# Patient Record
Sex: MALE | Race: WHITE | NOT HISPANIC OR LATINO | URBAN - METROPOLITAN AREA
[De-identification: names, ages, dates, MRNs, and addresses within clinical notes are randomized per-mention and may not be internally consistent; named-entity substitution may affect disease eponyms.]

---

## 2024-12-02 ENCOUNTER — APPOINTMENT (OUTPATIENT)
Dept: PAIN MEDICINE | Facility: CLINIC | Age: 69
End: 2024-12-02

## 2024-12-16 ENCOUNTER — OFFICE VISIT (OUTPATIENT)
Dept: PAIN MEDICINE | Facility: CLINIC | Age: 69
End: 2024-12-16
Payer: MEDICARE

## 2024-12-16 VITALS
HEART RATE: 75 BPM | DIASTOLIC BLOOD PRESSURE: 77 MMHG | RESPIRATION RATE: 18 BRPM | OXYGEN SATURATION: 97 % | TEMPERATURE: 96.3 F | SYSTOLIC BLOOD PRESSURE: 145 MMHG

## 2024-12-16 DIAGNOSIS — M51.26 LUMBAR DISC DISPLACEMENT WITHOUT MYELOPATHY: Primary | ICD-10-CM

## 2024-12-16 PROCEDURE — 1159F MED LIST DOCD IN RCRD: CPT | Performed by: PAIN MEDICINE

## 2024-12-16 PROCEDURE — 99204 OFFICE O/P NEW MOD 45 MIN: CPT | Performed by: PAIN MEDICINE

## 2024-12-16 PROCEDURE — 1125F AMNT PAIN NOTED PAIN PRSNT: CPT | Performed by: PAIN MEDICINE

## 2024-12-16 PROCEDURE — G2211 COMPLEX E/M VISIT ADD ON: HCPCS | Performed by: PAIN MEDICINE

## 2024-12-16 PROCEDURE — 99214 OFFICE O/P EST MOD 30 MIN: CPT | Performed by: PAIN MEDICINE

## 2024-12-16 RX ORDER — EPINEPHRINE 0.22MG
100 AEROSOL WITH ADAPTER (ML) INHALATION
COMMUNITY

## 2024-12-16 RX ORDER — ATORVASTATIN CALCIUM 20 MG/1
20 TABLET, FILM COATED ORAL DAILY
COMMUNITY

## 2024-12-16 RX ORDER — ALBUTEROL SULFATE 90 UG/1
2 INHALANT RESPIRATORY (INHALATION) EVERY 4 HOURS PRN
COMMUNITY
Start: 2024-08-20

## 2024-12-16 RX ORDER — LISINOPRIL 10 MG/1
10 TABLET ORAL DAILY
COMMUNITY

## 2024-12-16 RX ORDER — HYDROCHLOROTHIAZIDE 12.5 MG/1
12.5 TABLET ORAL DAILY
COMMUNITY
Start: 2024-03-22

## 2024-12-16 ASSESSMENT — PAIN DESCRIPTION - DESCRIPTORS: DESCRIPTORS: ACHING

## 2024-12-16 ASSESSMENT — PAIN - FUNCTIONAL ASSESSMENT: PAIN_FUNCTIONAL_ASSESSMENT: 0-10

## 2024-12-16 ASSESSMENT — PATIENT HEALTH QUESTIONNAIRE - PHQ9
1. LITTLE INTEREST OR PLEASURE IN DOING THINGS: NOT AT ALL
2. FEELING DOWN, DEPRESSED OR HOPELESS: NOT AT ALL
SUM OF ALL RESPONSES TO PHQ9 QUESTIONS 1 AND 2: 0

## 2024-12-16 ASSESSMENT — COLUMBIA-SUICIDE SEVERITY RATING SCALE - C-SSRS
1. IN THE PAST MONTH, HAVE YOU WISHED YOU WERE DEAD OR WISHED YOU COULD GO TO SLEEP AND NOT WAKE UP?: NO
6. HAVE YOU EVER DONE ANYTHING, STARTED TO DO ANYTHING, OR PREPARED TO DO ANYTHING TO END YOUR LIFE?: NO
2. HAVE YOU ACTUALLY HAD ANY THOUGHTS OF KILLING YOURSELF?: NO

## 2024-12-16 ASSESSMENT — PAIN SCALES - GENERAL: PAINLEVEL_OUTOF10: 4

## 2024-12-16 NOTE — H&P
History Of Present Illness  Neel Martinez is a 69 y.o. male  Patient referred by orthopedics, Dr. Encarnacion, for pain in lower back. Has slightly herniated disc. Majority of pain is right lower back. Had an MRI 9/10   Currently under the care of chiropractor that he described as beneficial but is not taking all the pain away it assist him to continue to move and ambulate.  Was also tried on ibuprofen that seems to alleviate the pain also for short duration of time.  Pain seems to be aggravated during standing.  Described as a sharp sensation when it gets bad.  Pain improves if he keeps his activity low and take the ibuprofen and do the stretching that he learned from the physical therapy.  Denies any recent injection he had in the past some injections through Dr. Mariano trujillo in 2020.  Describe some numbness at the bottom of his feet  Describe more pain on the right side and less on the left the pain mainly radiate to the upper part of the right thigh.  Pain is aggravated also by sitting for any extended duration of time or by laying flat on his back for extended duration of time  Past Medical History  Hypercholesterolemia  Hypertension     Surgical History  Knee replacement   Carpal tunnel  Clavicle repair    Social History  He has no history on file for tobacco use, alcohol use, and drug use.    Family History  Mother with scoliosis     Allergies  Allergies   Allergen Reactions    Sulfa (Sulfonamide Antibiotics) Shortness of breath     Review of Systems   12 Systems have been reviewed as follows.   Constitutional: Fever, weight gain, weight loss, appetite change, night sweats, fatigue, chills.  Eyes : blurry, double vision, vision, loss, tearing, redness, pain, sensitivity to light, glaucoma.  Ears, nose, mouth, and throat: Hearing loss, ringing in the ears, ear pain, nasal congestion, nasal drainage, nosebleeds, mouth, throat, irritation tooth problem.  Cardiovascular :chest pain, pressure, heart tracing,palpaitations  , sweating, leg swelling, high or low blood pressure  Pulmonary: Cough, yellow or green sputum, blood and sputum, shortness of breath, wheezing  Gastrointestinal: Nause, vomiting, diarrhea, constipation, pain, blood in stool, or vomitus, heartburn, difficulty swallowing  Genitourinary: incontinence, abnormal bleeding, abnormal discharge, urinary frequency, urinary hesitancy, pain, impotence sexual problem, infection, urinary retention  Musculoskeletal: Pain, stiffness, joint, redness or warmth, arthritis, back pain, weakness, muscle wasting, sprain or fracture  Neuro: Weight weakness, dizziness, change in voice, change in taste change in vision, change in hearing, loss, or change of sensation, trouble walking, balance problems coordination problems, shaking, speech problem  Endocrine , cold or heat intolerance, blood sugar problem, weight gain or loss missed periods hot flashes, sweats, change in body hair, change in libido, increased thirst, increased urination  Heme/lymph: Swelling, bleeding, problem anemia, bruising, enlarged lymph nodes  Allergic/immunologic: H. plus nasal drip, watery itchy eyes, nasal drainage, immunosuppressed  The above, were reviewed and noted negative except as noted.    Physical Exam   Vital signs reviewed, documented in chart     General:  Appears well, does not look in any major distress  Alert    HEENT:  Head atraumatic  Eyes normal inspection  PERRL  Normal ENT inspection  No signs of dehydration    NECK:  Normal inspection  Range of motion within normal     RESPIRATORY:  No respiratory distress    CVS:  Heart rate and rhythm regular    ABDOMEN/GI  Soft  Non-tender  No distention  No organomegaly      BACK:  Normal inspection, flexion and extension within normal limit   no tenderness upon the palpation of the facet joint  Si joints none tender to palpations     EXTREMITIES:  Non-Tender  Full ROM  Normal appearance  No Pedal edema  Power symmetrical , sensory examination  preserved.    NEURO:  Alert and oriented X 3  CNS normal as tested without focal neurological deficit   Sensation normal  Motor normal  reflexes normal    PSYCH:  Mood normal  Affect normal    SKIN:  Color normal  No rash  Warm  Dry  no sign of skin marking supportive of IV drug usage /abuse.    Last Recorded Vitals  Blood pressure 145/77, pulse 75, temperature 35.7 °C (96.3 °F), temperature source Temporal, resp. rate 18, SpO2 97%.    Assessment/Plan   69 years old with history and physical examination supportive of lumbar disc displacement tried and failed conservative management with chiropractic manipulation and nonsteroidal anti-inflammatory    Plan  Recommended for the patient to facilitate obtaining the results of the MRI of the lumbar spine area that were recently obtained through [facility]  I would recommend for the patient a lumbar epidural steroid injection to be performed under fluoroscopic guidance targeting the L4-L5 or the L5-S1 level with injection of contrast material to confirm needle placement benefits and risk were discussed with the patient and he would like to proceed I will reevaluate him after the performance of the epidural for further recommendation as his case progress      The above clinical summary has been dictated with voice recognition software. It has not been proofread for grammatical errors, typographical mistakes, or other semantic inconsistencies.    Thank you for visiting our office today. It was our pleasure to take part in your healthcare.     Please do not hesitate to contact the pain clinic after your visit with any questions or concerns at  M-F 8-4 pm       Bren Merrill M.D.  Medical Director , Division of Pain Medicine [facility]   of Anesthesiology and Pain Medicine  [facility] [employer]     [facility]  [id number]  Former[facility]. [address] [place] [id number]     Office: [phone]  Fax: [phone]      Bren Merrill MD

## 2024-12-16 NOTE — PROGRESS NOTES
Patient referred by orthopedics, Dr. Encarnacion, for  pain in lower back. Has slightly herniated disc. Majority of pain is right lower back. Had an MRI 9/10

## 2024-12-23 ENCOUNTER — LAB (OUTPATIENT)
Dept: LAB | Facility: CLINIC | Age: 69
End: 2024-12-23
Payer: MEDICARE

## 2024-12-23 ENCOUNTER — OFFICE VISIT (OUTPATIENT)
Dept: HEMATOLOGY/ONCOLOGY | Facility: CLINIC | Age: 69
End: 2024-12-23
Payer: MEDICARE

## 2024-12-23 VITALS
SYSTOLIC BLOOD PRESSURE: 112 MMHG | OXYGEN SATURATION: 96 % | HEART RATE: 97 BPM | DIASTOLIC BLOOD PRESSURE: 71 MMHG | WEIGHT: 234.35 LBS | TEMPERATURE: 97 F | BODY MASS INDEX: 32.7 KG/M2 | RESPIRATION RATE: 16 BRPM

## 2024-12-23 DIAGNOSIS — R89.8 ABNORMAL BONE MARROW EXAMINATION: Primary | ICD-10-CM

## 2024-12-23 DIAGNOSIS — I10 PRIMARY HYPERTENSION: ICD-10-CM

## 2024-12-23 DIAGNOSIS — D46.Z OTHER MYELODYSPLASTIC SYNDROMES: ICD-10-CM

## 2024-12-23 DIAGNOSIS — E66.812 CLASS 2 OBESITY WITHOUT SERIOUS COMORBIDITY WITH BODY MASS INDEX (BMI) OF 37.0 TO 37.9 IN ADULT, UNSPECIFIED OBESITY TYPE: ICD-10-CM

## 2024-12-23 DIAGNOSIS — E78.2 MIXED HYPERLIPIDEMIA: ICD-10-CM

## 2024-12-23 DIAGNOSIS — R89.8 ABNORMAL BONE MARROW EXAMINATION: ICD-10-CM

## 2024-12-23 LAB
BASOPHILS # BLD AUTO: 0.03 X10*3/UL (ref 0–0.1)
BASOPHILS NFR BLD AUTO: 0.3 %
EOSINOPHIL # BLD AUTO: 0.34 X10*3/UL (ref 0–0.7)
EOSINOPHIL NFR BLD AUTO: 4 %
ERYTHROCYTE [DISTWIDTH] IN BLOOD BY AUTOMATED COUNT: 12.6 % (ref 11.5–14.5)
HCT VFR BLD AUTO: 43.4 % (ref 41–52)
HGB BLD-MCNC: 15 G/DL (ref 13.5–17.5)
IMM GRANULOCYTES # BLD AUTO: 0.03 X10*3/UL (ref 0–0.7)
IMM GRANULOCYTES NFR BLD AUTO: 0.3 % (ref 0–0.9)
LDH SERPL L TO P-CCNC: 128 U/L (ref 84–246)
LYMPHOCYTES # BLD AUTO: 1.41 X10*3/UL (ref 1.2–4.8)
LYMPHOCYTES NFR BLD AUTO: 16.4 %
MCH RBC QN AUTO: 31.5 PG (ref 26–34)
MCHC RBC AUTO-ENTMCNC: 34.6 G/DL (ref 32–36)
MCV RBC AUTO: 91 FL (ref 80–100)
MONOCYTES # BLD AUTO: 0.93 X10*3/UL (ref 0.1–1)
MONOCYTES NFR BLD AUTO: 10.8 %
NEUTROPHILS # BLD AUTO: 5.86 X10*3/UL (ref 1.2–7.7)
NEUTROPHILS NFR BLD AUTO: 68.2 %
PLATELET # BLD AUTO: 219 X10*3/UL (ref 150–450)
RBC # BLD AUTO: 4.76 X10*6/UL (ref 4.5–5.9)
WBC # BLD AUTO: 8.6 X10*3/UL (ref 4.4–11.3)

## 2024-12-23 PROCEDURE — 1126F AMNT PAIN NOTED NONE PRSNT: CPT | Performed by: INTERNAL MEDICINE

## 2024-12-23 PROCEDURE — 3078F DIAST BP <80 MM HG: CPT | Performed by: INTERNAL MEDICINE

## 2024-12-23 PROCEDURE — 83615 LACTATE (LD) (LDH) ENZYME: CPT

## 2024-12-23 PROCEDURE — 1159F MED LIST DOCD IN RCRD: CPT | Performed by: INTERNAL MEDICINE

## 2024-12-23 PROCEDURE — 84165 PROTEIN E-PHORESIS SERUM: CPT

## 2024-12-23 PROCEDURE — 82784 ASSAY IGA/IGD/IGG/IGM EACH: CPT

## 2024-12-23 PROCEDURE — 99214 OFFICE O/P EST MOD 30 MIN: CPT | Mod: 25 | Performed by: INTERNAL MEDICINE

## 2024-12-23 PROCEDURE — 1160F RVW MEDS BY RX/DR IN RCRD: CPT | Performed by: INTERNAL MEDICINE

## 2024-12-23 PROCEDURE — 99204 OFFICE O/P NEW MOD 45 MIN: CPT | Performed by: INTERNAL MEDICINE

## 2024-12-23 PROCEDURE — 36415 COLL VENOUS BLD VENIPUNCTURE: CPT

## 2024-12-23 PROCEDURE — 85025 COMPLETE CBC W/AUTO DIFF WBC: CPT

## 2024-12-23 PROCEDURE — 84155 ASSAY OF PROTEIN SERUM: CPT

## 2024-12-23 PROCEDURE — 3074F SYST BP LT 130 MM HG: CPT | Performed by: INTERNAL MEDICINE

## 2024-12-23 PROCEDURE — 83521 IG LIGHT CHAINS FREE EACH: CPT

## 2024-12-23 ASSESSMENT — PAIN SCALES - GENERAL: PAINLEVEL_OUTOF10: 0-NO PAIN

## 2024-12-23 NOTE — PROGRESS NOTES
Patient ID: Neel Martinez is a 69 y.o. male.  Referring Physician:  Hussain Sherwood MD  Primary Care Provider: Hiren Donaldson MD  Visit Type: Initial Visit      Subjective    HPI I have been referred here because of something found on my MRI  The doctor who referred me here said that he could be sued if he didn't tell me everything that was found on the MRI.    Review of Systems   Constitutional: Negative.    HENT:  Negative.     Eyes: Negative.    Respiratory: Negative.     Cardiovascular: Negative.    Gastrointestinal: Negative.    Endocrine: Negative.    Genitourinary: Negative.     Musculoskeletal:  Positive for back pain.   Skin: Negative.    Neurological: Negative.    Hematological: Negative.    Psychiatric/Behavioral: Negative.          Objective   BSA: There is no height or weight on file to calculate BSA.  There were no vitals taken for this visit.    Past Medical History: hypertension  Past Surgical History: clavicle fracture 1974, carpal tunnel, right knee meniscus, right shoulder rotator cuff 2020, right TKA 2022  Family history: no family history of malignancy or hematologic disorders  Oncology History    No history exists.       Neel Martinez  has no history on file for tobacco use. Quit smoking 30 years ago  He  has no history on file for alcohol use.  He  has no history on file for drug use.    Physical Exam  Vitals reviewed.   Constitutional:       Appearance: Normal appearance.   HENT:      Head: Normocephalic.      Mouth/Throat:      Mouth: Mucous membranes are moist.   Eyes:      Extraocular Movements: Extraocular movements intact.      Pupils: Pupils are equal, round, and reactive to light.   Cardiovascular:      Rate and Rhythm: Normal rate and regular rhythm.      Pulses: Normal pulses.      Heart sounds: Normal heart sounds.   Pulmonary:      Effort: Pulmonary effort is normal.      Breath sounds: Normal breath sounds.   Abdominal:      General: Bowel sounds are normal.      Palpations: Abdomen  "is soft.   Musculoskeletal:         General: Normal range of motion.      Cervical back: Normal range of motion and neck supple.   Skin:     General: Skin is warm.   Neurological:      General: No focal deficit present.      Mental Status: He is alert and oriented to person, place, and time.   Psychiatric:         Mood and Affect: Mood normal.         Behavior: Behavior normal.         No results found for: \"WBC\"  No results found for: \"NRBC\"  No results found for: \"RBC\"  No results found for: \"HGB\"  No results found for: \"HCT\"  No results found for: \"MCV\"  No results found for: \"MCH\"  No results found for: \"MCHC\"  No results found for: \"RDW\"  No results found for: \"PLT\"  No results found for: \"MPV\"  No results found for: \"NEUTOPHILPCT\"  No results found for: \"IGPCT\"  No results found for: \"LYMPHOPCT\"  No results found for: \"MONOPCT\"  No results found for: \"EOSPCT\"  No results found for: \"BASOPCT\"  No results found for: \"NEUTROABS\"  No results found for: \"IGABSOL\"  No results found for: \"LYMPHSABS\"  No results found for: \"MONOSABS\"  No results found for: \"EOSABS\"  No results found for: \"BASOSABS\"    No components found for: \"PT\"  No results found for: \"APTT\"  Medication Documentation Review Audit       Reviewed by Luz Marina Watkins MA (Medical Assistant) on 12/23/24 at 1142      Medication Order Taking? Sig Documenting Provider Last Dose Status   albuterol 90 mcg/actuation inhaler [id number] Yes Inhale 2 puffs every 4 hours if needed. Historical Provider, MD  Active   atorvastatin (Lipitor) 20 mg tablet [id number] Yes Take 1 tablet (20 mg) by mouth once daily. Historical Provider, MD  Active   coenzyme Q-10 100 mg capsule [id number] Yes Take 1 capsule (100 mg) by mouth once daily. Historical Provider, MD  Active   hydroCHLOROthiazide (Microzide) 12.5 mg tablet [id number] Yes Take 1 tablet (12.5 mg) by mouth once daily. Historical Provider, MD  Active   lisinopril 10 mg tablet [id number] Yes Take 1 tablet (10 mg) by mouth " "once daily. Historical Provider, MD  Active   tirzepatide 2.5 mg/0.5 mL pen injector [id number] Yes Inject 2.5 mg under the skin every 7 days. Historical Provider, MD  Active                   Assessment/Plan    1) abnormal marrow signal  -MRI lumbar spine 9/22/2020 diffuse inhomogeneity of the marrow signal is entirely nonspecific and may be due to osteopenia, chronic anemia, hypoxemia or other marrow replacement processes could also conceivably contribute to this appearance  -CT abdomen 8/19/2024 : bones - multilevel degenerative changes in the lumbar spine; no aggressive osseous lesions; kidneys- subcentimeter hypoattenuating lesions in the right kidney, too small to characterize but likely benign  -he has been seeing Dr Sherwood at the [facility] for chronic lower back pain radiating down both legs x  10 years  -lower extremity EMG/NCS done on 9/26/2024: no measurable lumbar/lumbosacral nerve root injury; moderate peripheral neuropathy; superficial peroneal and sural nerve sensory axons to the dorsal lateral foot respectively are 45-60% degenerate bilaterally; no peripheral nerve entrapment  -MRI lumbar spine 9/10/2024- spinal cord normal in caliber and signal; conus terminates at T12/L1; spinal alignment mild levocurvature centered at L3, there is grade 1 anterolisthesis of L4 on L5; vertebral bodies heights maintained; disc spaces- loss of disc space at L4/5 and mild disc desiccation caudal to L2; marrow signal- suspect small osseous hemangioma in T12; mild-moderate marrow heterogeneity elsewhere; indeterminate renal lesions  -Dr Sherwood writes \"In regards to the mild heterogeneous bone marrow pattern noted within the sacrum and ilium. Although these are typically age/metabolic related, such as osteoporosis, one must always consider the possibility of myelodysplastic disease. Consequently, I did offer her the option of further metabolic and diagnostic workup through the [facility]\"  -Neel was " "offered TLIF/instrumented fusion at L3-L4, L4-5 but opted for conservative management  -he likewise is disinterested in any aggressive diagnostics, including bone marrow biopsy  -this \"abnormal marrow signal\" has already been reported in a 2020 MRI, and is a very nonspecific finding/description and only has a low likelihood of being malignant in nature  -his most recent CBC done on 12/20/2024 showing wbc 7.65, hgb 15, plt 218,000, ANC 4510  -with a completely normal CBC/diff, there would be no likelihood of MDS, which will be associated with and manifest with cytopenias  -he is not anemic, nor is he an \"extreme\" athlete, nor does he have any chronic medical conditions associated with hypoxia  -will screen him for plasma cell dyscrasia and myeloproliferative neoplasm    2) hyperlipidemia  -on atorvastatin    3) hypertension  -on hydrochlorothiazide  -on lisinopril    4) high BMI  -on tirzepatide     Problem List Items Addressed This Visit    None  Visit Diagnoses         Codes    Abnormal bone marrow examination    -  Primary R89.8    Relevant Orders    Lactate dehydrogenase (Completed)    CBC and Auto Differential (Completed)    Serum Protein Electrophoresis (Completed)    [place]/Lambda Free Light Chain, Serum (Completed)    Immunoglobulins (IgG, IgA, IgM) (Completed)    Myeloid Malignancies Panel (Completed)    Clinic Appointment Request Other (comment) (Telephone followup); JAREN SANDERS; [place] [id number]    Other myelodysplastic syndromes     D46.Z    Relevant Orders    Myeloid Malignancies Panel (Completed)    Clinic Appointment Request Other (comment) (Telephone followup); [facility]; [facility]                 Jaren Sanders MD                         "

## 2024-12-24 LAB
IGA SERPL-MCNC: 147 MG/DL (ref 70–400)
IGG SERPL-MCNC: 1630 MG/DL (ref 700–1600)
IGM SERPL-MCNC: 68 MG/DL (ref 40–230)
KAPPA LC SERPL-MCNC: 2.56 MG/DL (ref 0.33–1.94)
KAPPA LC/LAMBDA SER: 0.98 {RATIO} (ref 0.26–1.65)
LAMBDA LC SERPL-MCNC: 2.6 MG/DL (ref 0.57–2.63)
PROT SERPL-MCNC: 7.5 G/DL (ref 6.4–8.2)

## 2024-12-26 LAB
ALBUMIN: 4.4 G/DL (ref 3.4–5)
ALPHA 1 GLOBULIN: 0.3 G/DL (ref 0.2–0.6)
ALPHA 2 GLOBULIN: 0.6 G/DL (ref 0.4–1.1)
BETA GLOBULIN: 0.7 G/DL (ref 0.5–1.2)
GAMMA GLOBULIN: 1.5 G/DL (ref 0.5–1.4)
PATH REVIEW-SERUM PROTEIN ELECTROPHORESIS: ABNORMAL
PROTEIN ELECTROPHORESIS COMMENT: ABNORMAL

## 2024-12-31 LAB
ELECTRONICALLY SIGNED BY: NORMAL
MYELOID NGS RESULTS: NORMAL

## 2025-01-01 PROBLEM — D46.Z OTHER MYELODYSPLASTIC SYNDROMES: Status: ACTIVE | Noted: 2025-01-01

## 2025-01-01 PROBLEM — E66.812 CLASS 2 OBESITY WITHOUT SERIOUS COMORBIDITY WITH BODY MASS INDEX (BMI) OF 37.0 TO 37.9 IN ADULT: Status: ACTIVE | Noted: 2025-01-01

## 2025-01-01 PROBLEM — I10 PRIMARY HYPERTENSION: Status: ACTIVE | Noted: 2025-01-01

## 2025-01-01 PROBLEM — E78.2 MIXED HYPERLIPIDEMIA: Status: ACTIVE | Noted: 2025-01-01

## 2025-01-01 PROBLEM — R89.8 ABNORMAL BONE MARROW EXAMINATION: Status: ACTIVE | Noted: 2025-01-01

## 2025-01-01 ASSESSMENT — ENCOUNTER SYMPTOMS
CARDIOVASCULAR NEGATIVE: 1
NEUROLOGICAL NEGATIVE: 1
PSYCHIATRIC NEGATIVE: 1
RESPIRATORY NEGATIVE: 1
GASTROINTESTINAL NEGATIVE: 1
EYES NEGATIVE: 1
HEMATOLOGIC/LYMPHATIC NEGATIVE: 1
CONSTITUTIONAL NEGATIVE: 1
ENDOCRINE NEGATIVE: 1
BACK PAIN: 1

## 2025-01-06 ENCOUNTER — APPOINTMENT (OUTPATIENT)
Dept: HEMATOLOGY/ONCOLOGY | Facility: CLINIC | Age: 70
End: 2025-01-06
Payer: MEDICARE

## 2025-01-10 ENCOUNTER — TELEMEDICINE (OUTPATIENT)
Dept: HEMATOLOGY/ONCOLOGY | Facility: CLINIC | Age: 70
End: 2025-01-10
Payer: MEDICARE

## 2025-01-10 DIAGNOSIS — E78.2 MIXED HYPERLIPIDEMIA: Primary | ICD-10-CM

## 2025-01-10 DIAGNOSIS — E66.812 CLASS 2 OBESITY DUE TO EXCESS CALORIES WITHOUT SERIOUS COMORBIDITY WITH BODY MASS INDEX (BMI) OF 37.0 TO 37.9 IN ADULT: ICD-10-CM

## 2025-01-10 DIAGNOSIS — D46.Z OTHER MYELODYSPLASTIC SYNDROMES: ICD-10-CM

## 2025-01-10 DIAGNOSIS — R89.8 ABNORMAL BONE MARROW EXAMINATION: ICD-10-CM

## 2025-01-10 DIAGNOSIS — E66.09 CLASS 2 OBESITY DUE TO EXCESS CALORIES WITHOUT SERIOUS COMORBIDITY WITH BODY MASS INDEX (BMI) OF 37.0 TO 37.9 IN ADULT: ICD-10-CM

## 2025-01-10 DIAGNOSIS — I10 PRIMARY HYPERTENSION: ICD-10-CM

## 2025-01-10 NOTE — PROGRESS NOTES
"Patient ID: Neel Martinez is a 69 y.o. male.  Referring Physician: Hussain Sherwood MD  Primary Care Provider: Hiren Donaldson MD  Visit Type:  Follow Up     Virtual or Telephone Consent    While technically available, the patient was unable or unwilling to consent to connect via audio/video telehealth technology; therefore, I performed this visit using a real-time audio only connection between Neel Martinez & Jaren Sanders MD.  Verbal consent was requested and obtained from Neel Martinez on this date, 01/10/2025 for a telehealth visit and the patient's location was confirmed at the time of the visit.     Subjective    HPI How was my labwork?    Review of Systems   Constitutional: Negative.    HENT:  Negative.     Eyes: Negative.    Respiratory: Negative.     Cardiovascular: Negative.    Gastrointestinal: Negative.    Endocrine: Negative.    Genitourinary: Negative.     Musculoskeletal:  Positive for back pain.   Skin: Negative.    Neurological: Negative.    Hematological: Negative.    Psychiatric/Behavioral: Negative.          Objective   BSA: There is no height or weight on file to calculate BSA.  There were no vitals taken for this visit.     has no past medical history on file.   has no past surgical history on file.  No family history on file.  Oncology History    No history exists.       Neel Martinez  has no history on file for tobacco use.  He  has no history on file for alcohol use.  He  has no history on file for drug use.    Physical Exam    WBC   Date/Time Value Ref Range Status   12/23/2024 12:47 PM 8.6 4.4 - 11.3 x10*3/uL Final     No results found for: \"NRBC\"  RBC   Date Value Ref Range Status   12/23/2024 4.76 4.50 - 5.90 x10*6/uL Final     Hemoglobin   Date Value Ref Range Status   12/23/2024 15.0 13.5 - 17.5 g/dL Final     Hematocrit   Date Value Ref Range Status   12/23/2024 43.4 41.0 - 52.0 % Final     MCV   Date/Time Value Ref Range Status   12/23/2024 12:47 PM 91 80 - 100 fL Final     MCH " "  Date/Time Value Ref Range Status   12/23/2024 12:47 PM 31.5 26.0 - 34.0 pg Final     MCHC   Date/Time Value Ref Range Status   12/23/2024 12:47 PM 34.6 32.0 - 36.0 g/dL Final     RDW   Date/Time Value Ref Range Status   12/23/2024 12:47 PM 12.6 11.5 - 14.5 % Final     Platelets   Date/Time Value Ref Range Status   12/23/2024 12:47  150 - 450 x10*3/uL Final     No results found for: \"MPV\"  Neutrophils %   Date/Time Value Ref Range Status   12/23/2024 12:47 PM 68.2 40.0 - 80.0 % Final     Immature Granulocytes %, Automated   Date/Time Value Ref Range Status   12/23/2024 12:47 PM 0.3 0.0 - 0.9 % Final     Comment:     Immature Granulocyte Count (IG) includes promyelocytes, myelocytes and metamyelocytes but does not include bands. Percent differential counts (%) should be interpreted in the context of the absolute cell counts (cells/UL).     Lymphocytes %   Date/Time Value Ref Range Status   12/23/2024 12:47 PM 16.4 13.0 - 44.0 % Final     Monocytes %   Date/Time Value Ref Range Status   12/23/2024 12:47 PM 10.8 2.0 - 10.0 % Final     Eosinophils %   Date/Time Value Ref Range Status   12/23/2024 12:47 PM 4.0 0.0 - 6.0 % Final     Basophils %   Date/Time Value Ref Range Status   12/23/2024 12:47 PM 0.3 0.0 - 2.0 % Final     Neutrophils Absolute   Date/Time Value Ref Range Status   12/23/2024 12:47 PM 5.86 1.20 - 7.70 x10*3/uL Final     Comment:     Percent differential counts (%) should be interpreted in the context of the absolute cell counts (cells/uL).     Immature Granulocytes Absolute, Automated   Date/Time Value Ref Range Status   12/23/2024 12:47 PM 0.03 0.00 - 0.70 x10*3/uL Final     Lymphocytes Absolute   Date/Time Value Ref Range Status   12/23/2024 12:47 PM 1.41 1.20 - 4.80 x10*3/uL Final     Monocytes Absolute   Date/Time Value Ref Range Status   12/23/2024 12:47 PM 0.93 0.10 - 1.00 x10*3/uL Final     Eosinophils Absolute   Date/Time Value Ref Range Status   12/23/2024 12:47 PM 0.34 0.00 - 0.70 " "x10*3/uL Final     Basophils Absolute   Date/Time Value Ref Range Status   12/23/2024 12:47 PM 0.03 0.00 - 0.10 x10*3/uL Final       No components found for: \"PT\"  No results found for: \"APTT\"  Medication Documentation Review Audit       Reviewed by Jaren Sanders MD (Physician) on 01/01/25 at 0941      Medication Order Taking? Sig Documenting Provider Last Dose Status   albuterol 90 mcg/actuation inhaler [id number] Yes Inhale 2 puffs every 4 hours if needed. Historical Provider, MD  Active   atorvastatin (Lipitor) 20 mg tablet [id number] Yes Take 1 tablet (20 mg) by mouth once daily. Historical Provider, MD  Active   coenzyme Q-10 100 mg capsule [id number] Yes Take 1 capsule (100 mg) by mouth once daily. Historical Provider, MD  Active   hydroCHLOROthiazide (Microzide) 12.5 mg tablet [id number] Yes Take 1 tablet (12.5 mg) by mouth once daily. Historical Provider, MD  Active   lisinopril 10 mg tablet [id number] Yes Take 1 tablet (10 mg) by mouth once daily. Historical Provider, MD  Active   tirzepatide 2.5 mg/0.5 mL pen injector [id number] Yes Inject 2.5 mg under the skin every 7 days. Historical Provider, MD  Active                   Assessment/Plan    1) abnormal marrow signal  -MRI lumbar spine 9/22/2020 diffuse inhomogeneity of the marrow signal is entirely nonspecific and may be due to osteopenia, chronic anemia, hypoxemia or other marrow replacement processes could also conceivably contribute to this appearance  -CT abdomen 8/19/2024 : bones - multilevel degenerative changes in the lumbar spine; no aggressive osseous lesions; kidneys- subcentimeter hypoattenuating lesions in the right kidney, too small to characterize but likely benign  -he has been seeing Dr Sherwood at the [facility] for chronic lower back pain radiating down both legs x  10 years  -lower extremity EMG/NCS done on 9/26/2024: no measurable lumbar/lumbosacral nerve root injury; moderate peripheral neuropathy; superficial peroneal and " "sural nerve sensory axons to the dorsal lateral foot respectively are 45-60% degenerate bilaterally; no peripheral nerve entrapment  -MRI lumbar spine 9/10/2024- spinal cord normal in caliber and signal; conus terminates at T12/L1; spinal alignment mild levocurvature centered at L3, there is grade 1 anterolisthesis of L4 on L5; vertebral bodies heights maintained; disc spaces- loss of disc space at L4/5 and mild disc desiccation caudal to L2; marrow signal- suspect small osseous hemangioma in T12; mild-moderate marrow heterogeneity elsewhere; indeterminate renal lesions  -Dr Sherwood writes \"In regards to the mild heterogeneous bone marrow pattern noted within the sacrum and ilium. Although these are typically age/metabolic related, such as osteoporosis, one must always consider the possibility of myelodysplastic disease. Consequently, I did offer her the option of further metabolic and diagnostic workup through the [facility]\"  -Neel was offered TLIF/instrumented fusion at L3-L4, L4-5 but opted for conservative management  -he likewise is disinterested in any aggressive diagnostics, including bone marrow biopsy  -this \"abnormal marrow signal\" has already been reported in a 2020 MRI, and is a very nonspecific finding/description and only has a low likelihood of being malignant in nature  -his most recent CBC done on 12/20/2024 showing wbc 7.65, hgb 15, plt 218,000, ANC 4510  -with a completely normal CBC/diff, there would be no likelihood of MDS, which will be associated with and manifest with cytopenias  -he is not anemic, nor is he an \"extreme\" athlete, nor does he have any chronic medical conditions associated with hypoxia  -will screen him for plasma cell dyscrasia and myeloproliferative neoplasm  -here for interval followup via telephone  -myeloid NGS  with PHF6 (unknown significance)  -IgG 1630  -kappa LC 2.56  -SPEP/IF with increase in polyclonal gamma globulins  -wbc 8.6, hgb 15, plt 219,000, diff " normal  -results are all consistent with inflammation; there is no suspicion or evidence for hematologic malignancy  -he may return as needed     2) hyperlipidemia  -on atorvastatin     3) hypertension  -on hydrochlorothiazide  -on lisinopril     4) high BMI  -on tirzepatide       Problem List Items Addressed This Visit             ICD-10-CM    Abnormal bone marrow examination R89.8    Other myelodysplastic syndromes D46.Z            aJren Sanders MD

## 2025-01-11 ASSESSMENT — ENCOUNTER SYMPTOMS
HEMATOLOGIC/LYMPHATIC NEGATIVE: 1
CONSTITUTIONAL NEGATIVE: 1
NEUROLOGICAL NEGATIVE: 1
PSYCHIATRIC NEGATIVE: 1
RESPIRATORY NEGATIVE: 1
EYES NEGATIVE: 1
BACK PAIN: 1
GASTROINTESTINAL NEGATIVE: 1
CARDIOVASCULAR NEGATIVE: 1
ENDOCRINE NEGATIVE: 1

## 2025-03-11 ENCOUNTER — HOSPITAL ENCOUNTER (OUTPATIENT)
Dept: PAIN MEDICINE | Facility: CLINIC | Age: 70
Discharge: HOME | End: 2025-03-11
Payer: MEDICARE

## 2025-03-11 VITALS
OXYGEN SATURATION: 99 % | DIASTOLIC BLOOD PRESSURE: 97 MMHG | HEART RATE: 90 BPM | RESPIRATION RATE: 20 BRPM | SYSTOLIC BLOOD PRESSURE: 139 MMHG | TEMPERATURE: 98 F

## 2025-03-11 DIAGNOSIS — M51.26 LUMBAR DISC DISPLACEMENT WITHOUT MYELOPATHY: ICD-10-CM

## 2025-03-11 PROCEDURE — 62323 NJX INTERLAMINAR LMBR/SAC: CPT | Performed by: PAIN MEDICINE

## 2025-03-11 PROCEDURE — 2550000001 HC RX 255 CONTRASTS: Performed by: PAIN MEDICINE

## 2025-03-11 PROCEDURE — 2500000004 HC RX 250 GENERAL PHARMACY W/ HCPCS (ALT 636 FOR OP/ED): Performed by: PAIN MEDICINE

## 2025-03-11 RX ORDER — METHYLPREDNISOLONE ACETATE 80 MG/ML
INJECTION, SUSPENSION INTRA-ARTICULAR; INTRALESIONAL; INTRAMUSCULAR; SOFT TISSUE AS NEEDED
Status: COMPLETED | OUTPATIENT
Start: 2025-03-11 | End: 2025-03-11

## 2025-03-11 RX ORDER — LIDOCAINE HYDROCHLORIDE 10 MG/ML
INJECTION, SOLUTION EPIDURAL; INFILTRATION; INTRACAUDAL; PERINEURAL AS NEEDED
Status: COMPLETED | OUTPATIENT
Start: 2025-03-11 | End: 2025-03-11

## 2025-03-11 RX ORDER — BUPIVACAINE HYDROCHLORIDE 2.5 MG/ML
INJECTION, SOLUTION EPIDURAL; INFILTRATION; INTRACAUDAL AS NEEDED
Status: COMPLETED | OUTPATIENT
Start: 2025-03-11 | End: 2025-03-11

## 2025-03-11 RX ADMIN — METHYLPREDNISOLONE ACETATE 80 MG: 80 INJECTION, SUSPENSION INTRA-ARTICULAR; INTRALESIONAL; INTRAMUSCULAR; SOFT TISSUE at 10:04

## 2025-03-11 RX ADMIN — IOHEXOL 1 ML: 300 INJECTION, SOLUTION INTRAVENOUS at 10:04

## 2025-03-11 RX ADMIN — BUPIVACAINE HYDROCHLORIDE 5 ML: 2.5 INJECTION, SOLUTION EPIDURAL; INFILTRATION; INTRACAUDAL; PERINEURAL at 10:04

## 2025-03-11 RX ADMIN — LIDOCAINE HYDROCHLORIDE 2 ML: 10 INJECTION, SOLUTION EPIDURAL; INFILTRATION; INTRACAUDAL; PERINEURAL at 10:02

## 2025-03-11 ASSESSMENT — PAIN - FUNCTIONAL ASSESSMENT: PAIN_FUNCTIONAL_ASSESSMENT: 0-10

## 2025-03-11 ASSESSMENT — PAIN SCALES - GENERAL: PAINLEVEL_OUTOF10: 0 - NO PAIN

## 2025-03-11 NOTE — DISCHARGE INSTRUCTIONS
Post-injection instructions:    Your pain may not be gone immediately after the procedure--it usually takes the steroid 3-5 days to start working.   It may take several weeks for the medicine to reach its' full effect.   Pay attention to how much pain relief (what percentage compared to before the procedure) you get and for how long it lasts.     Activity: Avoid strenuous activity for 24 hours. After that return to your normal activity level.     Bandages: Remove after 24 hours     Showering/Bathing: You may shower after bandage is removed     Follow up: CALL OFFICE IN 7 DAYS [phone] LEAVE MESSAGE ABOUT THE RELIEF THAT WAS OBTAINED      Call the doctor immediately: if you notice:     Excessive bleeding from procedure site (brisk bright red bleeding from the site or bleeding that soaks the bandages or does not stop)   Severe headache  Inability to walk, leg or arm weakness or numbness that is worse after the procedure   Uncontrolled pain   New urinary or fecal incontinence   Signs of infection: Fever above 101.5F, redness, swelling, pus or drainage from the site    Epidural Injection    Why is this procedure done?  With an epidural injection, the doctor injects drugs deep into the area around your spinal cord. This is different than epidural anesthesia that is used for surgery or when a woman has a baby. Your spine is a group of bones in your back that protect the nerves in your spinal cord. Problems with your spine can cause swollen nerves in the spinal cord. This swelling leads to pain and can limit movement. In an epidural injection, the doctor may give you a drug to help with swelling and pain.  You may have an epidural injection in different parts of your back, based on where your pain is. For pain in your head or arms, you may have a cervical epidural injection. If your pain is in your upper or middle back, you may get a thoracic epidural injection. For pain in your lower back or legs, you may get a  lumbar epidural injection.    What will the results be?  The treatment may:  Lower pain  Reduce swelling in the nerves  Improve movement  What happens before the procedure?  Your doctor will take your history. Talk to the doctor about:  All the drugs you are taking. Be sure to include all prescription and over-the-counter (OTC) drugs, and herbal supplements. Tell the doctor about any drug allergy. Bring a list of drugs you take with you.  If you have high blood sugar or diabetes. Your drugs may need to be changed.  Any bleeding problems. Be sure to tell your doctor if you are taking any drugs that may cause bleeding. Some of these are warfarin, rivaroxaban, apixaban, ticagrelor, clopidogrel, ketorolac, ibuprofen, naproxen, or aspirin. Certain vitamins and herbs, such as garlic and fish oil, may also add to the risk for bleeding. You may need to stop these drugs as well. Talk to your doctor about them.  Tell the doctor if you are pregnant.  You will not be allowed to drive right away after the procedure. Ask a family member or a friend to drive you home.  What happens during the procedure?  To help the doctor make sure the drugs are being injected in the right place, your doctor may do an x-ray of your spine. Other times your doctor may do a continuous x-ray during the procedure. This is a fluoroscopy. The doctor may also use a colored dye or a contrast dye to check where to inject the drug.  You may be given a drug to help you relax. You may be given a drug to make the area of the injection numb.  The doctor will clean the skin on your back or neck. This helps prevent infection.  The doctor will put a needle through the skin toward your spine. The drug will be injected into a space near the spine.  The needle will be taken out and a bandage will be placed over the injection site.  What happens after the procedure?  Staff will check on you to make sure you are doing well. They will tell you when you can go  home.  What care is needed at home?  Relax on the day of the injection.  Do not drive or run machines for at least 12 hours afterwards.  Apply ice to the injection site. Place an ice pack or a bag of frozen peas wrapped in a towel over the painful part. Never put ice right on the skin. Do not leave the ice on more than 10 to 15 minutes at a time.  It may take a few days before you will feel the effects of the injection.  What follow-up care is needed?  Your doctor may ask you to make visits to the office to check on your progress. Be sure to keep these visits. You may also need to see a physical therapist (PT). The PT will teach you exercises to help you get back your strength and motion. Ask your doctor when you can exercise.  What problems could happen?  Bleeding  Infection (rare)  Headache  Nerve injury  If you have diabetes, your blood sugar can go up after the injection. Check with your doctor if you need more treatment for this.  Last Reviewed Date

## 2025-04-11 ENCOUNTER — TELEPHONE (OUTPATIENT)
Dept: PAIN MEDICINE | Facility: CLINIC | Age: 70
End: 2025-04-11
Payer: MEDICARE

## 2025-04-11 NOTE — TELEPHONE ENCOUNTER
Patient reports very little relief from LESB in March. Made bonnie.   [FreeTextEntry1] : Gait: Presents ambulating independently without signs of antalgia. Good coordination and balance noted.\par GENERAL: alert, cooperative, in NAD\par SKIN: The skin is intact, warm, pink and dry over the area examined.\par EYES: Normal conjunctiva, normal eyelids and pupils were equal and round.\par ENT: normal ears, normal nose and normal lips.\par CARDIOVASCULAR: brisk capillary refill, but no peripheral edema.\par RESPIRATORY: The patient is in no apparent respiratory distress. They're taking full deep breaths without use of accessory muscles or evidence of audible wheezes or stridor without the use of a stethoscope. Normal respiratory effort.\par ABDOMEN: not examined\par \par Examination of bilateral knee:\par Skin intact\par No effusion present\par Painless ROM from full extension 0 to 120 degrees of flexion\par TTP over quad tendon and patellar tendon on right knee and medial patellar tendon over left knee.\par No crepitus felt with ROM of the knee\par No J sign\par No pain with patellofemoral compression\par No patellar apprehension\par No Tenderness palpation on the medial aspect of the patella\par No TTP over lateral femoral condyle\par No joint line tenderness\par Negative [place]\par Negative Lachman\par Stable to varus/valgus stress\par

## 2025-05-09 ENCOUNTER — OFFICE VISIT (OUTPATIENT)
Dept: PAIN MEDICINE | Facility: CLINIC | Age: 70
End: 2025-05-09
Payer: MEDICARE

## 2025-05-09 ENCOUNTER — TELEPHONE (OUTPATIENT)
Dept: PAIN MEDICINE | Facility: CLINIC | Age: 70
End: 2025-05-09

## 2025-05-09 VITALS
DIASTOLIC BLOOD PRESSURE: 68 MMHG | HEART RATE: 88 BPM | TEMPERATURE: 97.2 F | OXYGEN SATURATION: 97 % | RESPIRATION RATE: 18 BRPM | SYSTOLIC BLOOD PRESSURE: 135 MMHG

## 2025-05-09 DIAGNOSIS — M47.816 LUMBAR SPONDYLOSIS: Primary | ICD-10-CM

## 2025-05-09 PROCEDURE — 3078F DIAST BP <80 MM HG: CPT

## 2025-05-09 PROCEDURE — 99213 OFFICE O/P EST LOW 20 MIN: CPT

## 2025-05-09 PROCEDURE — 1159F MED LIST DOCD IN RCRD: CPT

## 2025-05-09 PROCEDURE — 1125F AMNT PAIN NOTED PAIN PRSNT: CPT

## 2025-05-09 PROCEDURE — 3075F SYST BP GE 130 - 139MM HG: CPT

## 2025-05-09 ASSESSMENT — PAIN SCALES - GENERAL: PAINLEVEL_OUTOF10: 6

## 2025-05-09 NOTE — PROGRESS NOTES
Subjective   Patient ID: Neel Martinez is a 70 y.o. male who presents for Pain.  HPI    69 yo male presents today for follow up after lumbar epidural. He had midline L4- L5 epidural steroid injection on 03/11. States that he had no relief in symptoms. Today he is reporting 6/10 pain in the low back. States that he cannot lay on right side, right hip is very painful.  Pain is worse with standing. Pain will radiate down the right buttock. Experiences pain with descending stairs. Denies radiation to the groin. Denies bowel or bladder incontinence. Alleviating factors include nothing, states that ibuprofen helps a little. He is icing his back. Lidocaine patches and CBD take the edge off a little bit.     Review of Systems  All 13 systems were reviewed and are within normal levels except as noted below or per HPI. Positive and pertinent negative responses are noted below or in the HPI   Denied any fever or chills. No weight loss and no night sweats. No cough or sputum production. No diarrhea   Denies constipation.   No bladder and bowel incontinence and no other changes in bladder and bowel. No skin changes. Reports tiredness and fatigability only if the pain is not controlled.   Denied opioids diversion and abuse and denies alcoholism. Denies overuse of the pain medications.      Objective   Physical Exam  General   Alert and oriented x4, pleasant and cooperative.      HEENT  Pupils are equal and normal in size. Ears, nose, mouth, and throat appear to be WNL.  Head atraumatic, symmetric.      No signs of sedation or signs of withdrawal apparent.     Psychiatric   No signs of depression apparent. Appropriate mood and affect.     Neuro   No focal neurological deficit apparent. Ambulation at baseline. Normal visual inspection of the lumbar spine. Lumbar facets tender to palpation. Bilateral SI joints nontender to palpation. Negative GEOFF and compression bilaterally. SILT. Normal strength in the bilateral lower extremities.        Respiratory  No respiratory distress, respirations equal and unlabored.     Abdomen  Soft and nontender, no distention noted.     Skin  Warm, dry and intact. No skin markings supportive of recent IV drug usage .            Results  Test Name Value Interpretation Reference Range Facility                                                                                                  MR LUMBAR SPINE WO CONTRAST on 09-   MR LUMBAR SPINE WO CONTRAST MRI LUMBAR SPINE WITHOUT CONTRAST  HISTORY:Back and lower extremity pain.  COMPARISON: Lumbar spine x-ray 9/5/2024.  TECHNIQUE: MRI of the lumbar spine was performed without contrast using standard department technique.  FINDINGS:  Spinal cord: Normal in caliber and signal.  Conus: Terminates at T12/L1.  Spinal alignment: Mild levocurvature centered at L3. There is grade 1 anterolisthesis of L4 on L5.  Vertebral bodies: Heights maintained.  Disc spaces: Loss of disc space at L4/5 and mild disc desiccation caudal to L2.  Marrow signal: Suspect small osseous hemangioma in T12. Mild-moderate marrow heterogeneity elsewhere..  T12/L1:No disk bulge or herniation. No central canal or foraminal stenosis.  L1/2: Minimal disc bulge with broad-based left foraminal and extraforaminal disc protrusion. This is best seen on sagittal images. There is no central canal stenosis. Mild left and no right foraminal stenosis.  L2/3: Diffuse disc bulge with slight focality in the right subarticular and foraminal region. No stenosis.  L3/4: Diffuse disc osteophyte complex. No central canal stenosis. Moderately severe left and severe right foraminal stenosis. Impingement on the exiting L3 nerve roots not excluded.  L4/5: Uncovering of the disc and a diffuse disc bulge. There is moderate bilateral facet arthropathy. There is moderate narrowing of both lateral recesses. Central canal otherwise patent. Severe bilateral foraminal stenosis and likely impingement on the exiting L4 nerve  roots.  L5/S1: Minimal disc bulge. No stenosis.  Multiple small subcentimeter T2 hyperintense, T1 hypointense foci are seen in the right kidney. These are nonspecific but likely cysts.  IMPRESSION:  Multilevel degenerative changes, most pronounced between L3-L5 with areas of severe foraminal stenosis and suspected nerve root impingement. See details above.  Indeterminate renal lesions. Follow-up with renal ultrasound.  Electronically signed by: Manuel Echeverria MD 09/10/2024 07:16 AM EDT  Workstation: [id number]  Technologist: MB  Dictated By: MANUEL ECHEVERRIA MD  Signed By: MANUEL ECHEVERRIA MD  Signed Out: 09/10/24 07:16:17                                                                                                         Assessment/Plan     71 yo male with chronic low back pain and buttock pain associated with lumbar spondylosis, foraminal stenosis, facet arthropathy.      Schedule diagnostic medial nerve branch blocks bilateral L3- 4, L4- L5 under fluoroscopic guidance. We will reassess following the procedure and if he reports positive response can proceed with RFA.     Follow up as needed.     Explained plan to this patient, and patient verbalized understanding and agreement with the plan.     Please do not hesitate to contact the pain clinic after your visit with any questions or concerns at  M-F 8-4 pm     Patient was reminded not to share medications, not to take prescription medications that were not prescribed to the patient, and not to increase or change dose without consulting the pain clinic. I advised the patient to always take the least amount of medication needed to keep symptoms under control.       Antonieta Young, AVILA-CNP 05/09/25 8:57 AM

## 2025-05-14 ENCOUNTER — TELEPHONE (OUTPATIENT)
Dept: PAIN MEDICINE | Facility: CLINIC | Age: 70
End: 2025-05-14
Payer: MEDICARE

## 2025-05-14 DIAGNOSIS — G89.29 CHRONIC LOW BACK PAIN, UNSPECIFIED BACK PAIN LATERALITY, UNSPECIFIED WHETHER SCIATICA PRESENT: Primary | ICD-10-CM

## 2025-05-14 DIAGNOSIS — M54.50 CHRONIC LOW BACK PAIN, UNSPECIFIED BACK PAIN LATERALITY, UNSPECIFIED WHETHER SCIATICA PRESENT: Primary | ICD-10-CM

## 2025-05-15 ENCOUNTER — HOSPITAL ENCOUNTER (OUTPATIENT)
Dept: RADIOLOGY | Facility: HOSPITAL | Age: 70
Discharge: HOME | End: 2025-05-15
Payer: MEDICARE

## 2025-05-15 DIAGNOSIS — M54.50 CHRONIC LOW BACK PAIN, UNSPECIFIED BACK PAIN LATERALITY, UNSPECIFIED WHETHER SCIATICA PRESENT: ICD-10-CM

## 2025-05-15 DIAGNOSIS — G89.29 CHRONIC LOW BACK PAIN, UNSPECIFIED BACK PAIN LATERALITY, UNSPECIFIED WHETHER SCIATICA PRESENT: ICD-10-CM

## 2025-05-15 PROCEDURE — 72120 X-RAY BEND ONLY L-S SPINE: CPT

## 2025-06-05 ENCOUNTER — HOSPITAL ENCOUNTER (OUTPATIENT)
Dept: PAIN MEDICINE | Facility: CLINIC | Age: 70
Discharge: HOME | End: 2025-06-05
Payer: MEDICARE

## 2025-06-05 VITALS
OXYGEN SATURATION: 98 % | SYSTOLIC BLOOD PRESSURE: 144 MMHG | RESPIRATION RATE: 20 BRPM | TEMPERATURE: 97.2 F | HEART RATE: 80 BPM | DIASTOLIC BLOOD PRESSURE: 67 MMHG

## 2025-06-05 DIAGNOSIS — M47.816 LUMBAR SPONDYLOSIS: ICD-10-CM

## 2025-06-05 PROCEDURE — 2500000004 HC RX 250 GENERAL PHARMACY W/ HCPCS (ALT 636 FOR OP/ED): Performed by: PAIN MEDICINE

## 2025-06-05 PROCEDURE — 64494 INJ PARAVERT F JNT L/S 2 LEV: CPT | Mod: 50 | Performed by: PAIN MEDICINE

## 2025-06-05 PROCEDURE — 64494 INJ PARAVERT F JNT L/S 2 LEV: CPT | Performed by: PAIN MEDICINE

## 2025-06-05 PROCEDURE — 64493 INJ PARAVERT F JNT L/S 1 LEV: CPT | Mod: 50 | Performed by: PAIN MEDICINE

## 2025-06-05 PROCEDURE — 64493 INJ PARAVERT F JNT L/S 1 LEV: CPT | Performed by: PAIN MEDICINE

## 2025-06-05 RX ORDER — LIDOCAINE HYDROCHLORIDE 20 MG/ML
INJECTION, SOLUTION EPIDURAL; INFILTRATION; INTRACAUDAL; PERINEURAL AS NEEDED
Status: COMPLETED | OUTPATIENT
Start: 2025-06-05 | End: 2025-06-05

## 2025-06-05 RX ORDER — LIDOCAINE HYDROCHLORIDE 10 MG/ML
INJECTION, SOLUTION EPIDURAL; INFILTRATION; INTRACAUDAL; PERINEURAL AS NEEDED
Status: COMPLETED | OUTPATIENT
Start: 2025-06-05 | End: 2025-06-05

## 2025-06-05 RX ADMIN — LIDOCAINE HYDROCHLORIDE 10 ML: 20 INJECTION, SOLUTION EPIDURAL; INFILTRATION; INTRACAUDAL at 10:28

## 2025-06-05 RX ADMIN — LIDOCAINE HYDROCHLORIDE 2 ML: 10 INJECTION, SOLUTION EPIDURAL; INFILTRATION; INTRACAUDAL; PERINEURAL at 10:24

## 2025-06-05 ASSESSMENT — PAIN SCALES - GENERAL
PAINLEVEL_OUTOF10: 0 - NO PAIN
PAINLEVEL_OUTOF10: 5 - MODERATE PAIN
PAINLEVEL_OUTOF10: 0 - NO PAIN

## 2025-06-05 ASSESSMENT — PAIN DESCRIPTION - DESCRIPTORS: DESCRIPTORS: SHARP;ACHING

## 2025-06-05 ASSESSMENT — PAIN - FUNCTIONAL ASSESSMENT: PAIN_FUNCTIONAL_ASSESSMENT: 0-10

## 2025-06-05 NOTE — DISCHARGE INSTRUCTIONS
Post-injection instructions FOR FACET BLOCK      Pay attention to how much pain relief (what percentage compared to before the procedure) you get and for how long it lasts.     THIS IS A TEMPORARY NUMBING OF PAIN THIS IS BEING USED AS A DIAGNOSTIC INDICATOR IF THIS IS THE SOURCE OF YOUR PAIN    Activity:  RETURN TO NORMAL ACTIVITY  SEE IF YOU CAN APPRECIATE AN IMPROVEMENT IN THE PAIN    Bandages: Remove after 24 hours     Showering/Bathing: You may shower after bandage is removed     Follow up: CALL OFFICE NEXT BUSINESS [phone] LEAVE MESSAGE ABOUT THE % OF RELIEF THAT WAS OBTAINED AND FOR HOW MANY HOURS      Call the OFFICE immediately: if you notice:     Excessive bleeding from procedure site (brisk bright red bleeding from the site or bleeding that soaks the bandages or does not stop)   Severe headache  Inability to walk, leg or arm weakness or numbness that is worse after the procedure   Uncontrolled pain   New urinary or fecal incontinence   Signs of infection: Fever above 101.5F, redness, swelling, pus or drainage from the site

## 2025-06-09 ENCOUNTER — TELEPHONE (OUTPATIENT)
Dept: PAIN MEDICINE | Facility: CLINIC | Age: 70
End: 2025-06-09
Payer: MEDICARE

## 2025-06-09 DIAGNOSIS — M47.816 LUMBAR SPONDYLOSIS: Primary | ICD-10-CM

## 2025-06-10 ENCOUNTER — TELEPHONE (OUTPATIENT)
Dept: PAIN MEDICINE | Facility: CLINIC | Age: 70
End: 2025-06-10

## 2025-07-01 ENCOUNTER — HOSPITAL ENCOUNTER (OUTPATIENT)
Dept: PAIN MEDICINE | Facility: CLINIC | Age: 70
Discharge: HOME | End: 2025-07-01
Payer: MEDICARE

## 2025-07-01 VITALS
RESPIRATION RATE: 18 BRPM | TEMPERATURE: 97.1 F | SYSTOLIC BLOOD PRESSURE: 114 MMHG | DIASTOLIC BLOOD PRESSURE: 54 MMHG | OXYGEN SATURATION: 99 % | HEART RATE: 77 BPM

## 2025-07-01 DIAGNOSIS — M47.816 LUMBAR SPONDYLOSIS: ICD-10-CM

## 2025-07-01 PROCEDURE — 64493 INJ PARAVERT F JNT L/S 1 LEV: CPT | Mod: 50 | Performed by: PAIN MEDICINE

## 2025-07-01 PROCEDURE — 7100000009 HC PHASE TWO TIME - INITIAL BASE CHARGE

## 2025-07-01 PROCEDURE — 7100000010 HC PHASE TWO TIME - EACH INCREMENTAL 1 MINUTE

## 2025-07-01 PROCEDURE — 2500000004 HC RX 250 GENERAL PHARMACY W/ HCPCS (ALT 636 FOR OP/ED): Performed by: PAIN MEDICINE

## 2025-07-01 PROCEDURE — 64494 INJ PARAVERT F JNT L/S 2 LEV: CPT | Performed by: PAIN MEDICINE

## 2025-07-01 PROCEDURE — 64493 INJ PARAVERT F JNT L/S 1 LEV: CPT | Performed by: PAIN MEDICINE

## 2025-07-01 PROCEDURE — 64494 INJ PARAVERT F JNT L/S 2 LEV: CPT | Mod: 50 | Performed by: PAIN MEDICINE

## 2025-07-01 RX ORDER — LIDOCAINE HYDROCHLORIDE 10 MG/ML
INJECTION, SOLUTION EPIDURAL; INFILTRATION; INTRACAUDAL; PERINEURAL AS NEEDED
Status: COMPLETED | OUTPATIENT
Start: 2025-07-01 | End: 2025-07-01

## 2025-07-01 RX ORDER — BUPIVACAINE HYDROCHLORIDE 5 MG/ML
INJECTION, SOLUTION PERINEURAL AS NEEDED
Status: COMPLETED | OUTPATIENT
Start: 2025-07-01 | End: 2025-07-01

## 2025-07-01 RX ADMIN — LIDOCAINE HYDROCHLORIDE 10 ML: 10 INJECTION, SOLUTION EPIDURAL; INFILTRATION; INTRACAUDAL; PERINEURAL at 08:20

## 2025-07-01 RX ADMIN — BUPIVACAINE HYDROCHLORIDE 10 ML: 5 INJECTION, SOLUTION PERINEURAL at 08:20

## 2025-07-01 ASSESSMENT — PAIN DESCRIPTION - DESCRIPTORS: DESCRIPTORS: SHARP

## 2025-07-01 ASSESSMENT — PAIN - FUNCTIONAL ASSESSMENT: PAIN_FUNCTIONAL_ASSESSMENT: 0-10

## 2025-07-01 ASSESSMENT — PAIN SCALES - GENERAL: PAINLEVEL_OUTOF10: 7

## 2025-07-01 NOTE — DISCHARGE INSTRUCTIONS
Post-injection instructions FOR FACET BLOCK      Pay attention to how much pain relief (what percentage compared to before the procedure) you get and for how long it lasts.     THIS IS A TEMPORARY NUMBING OF PAIN THIS IS BEING USED AS A DIAGNOSTIC INDICATOR IF THIS IS THE SOURCE OF YOUR PAIN    Activity:  RETURN TO NORMAL ACTIVITY  SEE IF YOU CAN APPRECIATE AN IMPROVEMENT IN THE PAIN    Bandages: Remove after 24 hours     Showering/Bathing: You may shower after bandage is removed     Follow up: CALL OFFICE NEXT BUSINESS [phone] LEAVE MESSAGE ABOUT THE % OF RELIEF THAT WAS OBTAINED AND FOR HOW MANY HOURS      Call the OFFICE immediately: if you notice:     Excessive bleeding from procedure site (brisk bright red bleeding from the site or bleeding that soaks the bandages or does not stop)   Severe headache  Inability to walk, leg or arm weakness or numbness that is worse after the procedure   Uncontrolled pain   New urinary or fecal incontinence   Signs of infection: Fever above 101.5F, redness, swelling, pus or drainage from the site

## 2025-07-02 ENCOUNTER — TELEPHONE (OUTPATIENT)
Dept: PAIN MEDICINE | Facility: CLINIC | Age: 70
End: 2025-07-02
Payer: MEDICARE

## 2025-07-02 DIAGNOSIS — M47.816 LUMBAR SPONDYLOSIS: Primary | ICD-10-CM

## 2025-07-02 NOTE — TELEPHONE ENCOUNTER
This patient reports 85% relief for 6 .5 hours after lumbar  facet long.  Can you please order next step?

## 2025-08-07 ENCOUNTER — HOSPITAL ENCOUNTER (OUTPATIENT)
Dept: PAIN MEDICINE | Facility: CLINIC | Age: 70
Discharge: HOME | End: 2025-08-07
Payer: MEDICARE

## 2025-08-07 VITALS
DIASTOLIC BLOOD PRESSURE: 77 MMHG | HEART RATE: 72 BPM | RESPIRATION RATE: 18 BRPM | SYSTOLIC BLOOD PRESSURE: 136 MMHG | OXYGEN SATURATION: 96 %

## 2025-08-07 DIAGNOSIS — M47.816 LUMBAR SPONDYLOSIS: ICD-10-CM

## 2025-08-07 PROCEDURE — 3700000012 HC SEDATION LEVEL 5+ TIME - INITIAL 15 MINUTES 5/> YEARS

## 2025-08-07 PROCEDURE — 2720000007 HC OR 272 NO HCPCS

## 2025-08-07 PROCEDURE — 2500000004 HC RX 250 GENERAL PHARMACY W/ HCPCS (ALT 636 FOR OP/ED): Performed by: PAIN MEDICINE

## 2025-08-07 PROCEDURE — 99152 MOD SED SAME PHYS/QHP 5/>YRS: CPT | Performed by: PAIN MEDICINE

## 2025-08-07 PROCEDURE — 64635 DESTROY LUMB/SAC FACET JNT: CPT | Performed by: PAIN MEDICINE

## 2025-08-07 PROCEDURE — 64635 DESTROY LUMB/SAC FACET JNT: CPT | Mod: 50 | Performed by: PAIN MEDICINE

## 2025-08-07 PROCEDURE — 64636 DESTROY L/S FACET JNT ADDL: CPT | Performed by: PAIN MEDICINE

## 2025-08-07 PROCEDURE — 7100000010 HC PHASE TWO TIME - EACH INCREMENTAL 1 MINUTE

## 2025-08-07 PROCEDURE — 64636 DESTROY L/S FACET JNT ADDL: CPT | Mod: 50 | Performed by: PAIN MEDICINE

## 2025-08-07 PROCEDURE — 7100000009 HC PHASE TWO TIME - INITIAL BASE CHARGE

## 2025-08-07 RX ORDER — BUPIVACAINE HYDROCHLORIDE 5 MG/ML
INJECTION, SOLUTION EPIDURAL; INTRACAUDAL; PERINEURAL AS NEEDED
Status: COMPLETED | OUTPATIENT
Start: 2025-08-07 | End: 2025-08-07

## 2025-08-07 RX ORDER — MIDAZOLAM HYDROCHLORIDE 1 MG/ML
INJECTION, SOLUTION INTRAMUSCULAR; INTRAVENOUS AS NEEDED
Status: COMPLETED | OUTPATIENT
Start: 2025-08-07 | End: 2025-08-07

## 2025-08-07 RX ORDER — LIDOCAINE HYDROCHLORIDE 10 MG/ML
INJECTION, SOLUTION EPIDURAL; INFILTRATION; INTRACAUDAL; PERINEURAL AS NEEDED
Status: COMPLETED | OUTPATIENT
Start: 2025-08-07 | End: 2025-08-07

## 2025-08-07 RX ADMIN — MIDAZOLAM 2 MG: 1 INJECTION INTRAMUSCULAR; INTRAVENOUS at 10:31

## 2025-08-07 RX ADMIN — LIDOCAINE HYDROCHLORIDE 10 ML: 10 INJECTION, SOLUTION EPIDURAL; INFILTRATION; INTRACAUDAL; PERINEURAL at 10:31

## 2025-08-07 RX ADMIN — BUPIVACAINE HYDROCHLORIDE 10 ML: 5 INJECTION, SOLUTION EPIDURAL; INTRACAUDAL; PERINEURAL at 10:31

## 2025-08-07 ASSESSMENT — PAIN SCALES - GENERAL: PAINLEVEL_OUTOF10: 5 - MODERATE PAIN

## 2025-08-07 ASSESSMENT — PAIN - FUNCTIONAL ASSESSMENT: PAIN_FUNCTIONAL_ASSESSMENT: 0-10

## 2025-08-07 ASSESSMENT — PAIN DESCRIPTION - DESCRIPTORS: DESCRIPTORS: ACHING

## 2025-08-07 NOTE — H&P
History Of Present Illness  Neel Martinez is a 70 y.o. male presenting with BACK PAIN      Past Medical History  Medical History[1]  Surgical History  Surgical History[2]  Social History  He has no history on file for tobacco use, alcohol use, and drug use.    Family History  Family History[3]     Allergies  Allergies[4]  Review of Systems   All 13 systems were reviewed and are within normal levels except as noted below or per HPI. Positive and pertinent negative responses are noted below or in the HPI   Denied any fever or chills. No weight loss and no night sweats. No cough or sputum production. No diarrhea   No constipation  No bladder and bowel incontinence and no other changes in bladder and bowel. No skin changes.   Denied opioids diversion and abuse and denies alcoholism. Denies overuse of  pain medications.    Physical Exam       Past medical history no interval changes has been noted    On physical examination    General   Alert, oriented x3 pleasant and cooperative. Does not look in any major distress.    HEENT  Pupils normal in size. Ears, nose, mouth, and throat appear to be in normal condition.  Head atraumatic      No signs of sedation or signs of withdrawal apparent.    Psychiatric   No signs of depression apparent.    Neuro   No focal neurological deficit apparent. Ambulation at baseline.      Respiratory  No respiratory distress     Abdomen  no distention     Skin  No skin markings supportive of recent IV drug usage .    Cardiovascular  Regular rate and rhythm     Last Recorded Vitals  Blood pressure 154/74, pulse 76, resp. rate 18, SpO2 95%.    Assessment/Plan   HERE FOR RF LUMBAR      Arvindallgwen Merrill MD       [1] No past medical history on file.  [2] No past surgical history on file.  [3] No family history on file.  [4]   Allergies  Allergen Reactions    Sulfa (Sulfonamide Antibiotics) Shortness of breath

## 2025-08-07 NOTE — DISCHARGE INSTRUCTIONS
Post-injection instructions FOR Radiofrequency Ablation    Your radiofrequency ablation was completed today is not unusual to have some exacerbation of the pain before you get better.  Radiofrequency ablation takes an average of 2 to 3 weeks before it completely starts showing full results      Activity:  RETURN TO NORMAL ACTIVITY once the sedation is completely worn off do not sign any legal document for the first 24 hours do not drive or operate machinery for the first 24 hours until the sedation effect is completely worn off    Bandages: Remove after 24 hours     Showering/Bathing: You may shower after bandage is removed     May use ice at the site of the injection for the first 24 hours      Call the OFFICE immediately: if you notice:     Excessive bleeding from procedure site (brisk bright red bleeding from the site or bleeding that soaks the bandages or does not stop)   Severe headache  Inability to walk, leg or arm weakness or numbness that is worse after the procedure   Uncontrolled pain   New urinary or fecal incontinence   Signs of infection: Fever above 101.5F, redness, swelling, pus or drainage from the site    Please contact the pain clinic at [phone]  in 3 weeks to report results or with any further questions or concerns